# Patient Record
Sex: MALE | Race: WHITE | NOT HISPANIC OR LATINO
[De-identification: names, ages, dates, MRNs, and addresses within clinical notes are randomized per-mention and may not be internally consistent; named-entity substitution may affect disease eponyms.]

---

## 2021-03-13 ENCOUNTER — TRANSCRIPTION ENCOUNTER (OUTPATIENT)
Age: 41
End: 2021-03-13

## 2021-03-26 ENCOUNTER — TRANSCRIPTION ENCOUNTER (OUTPATIENT)
Age: 41
End: 2021-03-26

## 2021-04-22 ENCOUNTER — OUTPATIENT (OUTPATIENT)
Dept: OUTPATIENT SERVICES | Facility: HOSPITAL | Age: 41
LOS: 1 days | End: 2021-04-22
Payer: COMMERCIAL

## 2021-04-22 ENCOUNTER — APPOINTMENT (OUTPATIENT)
Dept: ULTRASOUND IMAGING | Facility: IMAGING CENTER | Age: 41
End: 2021-04-22
Payer: COMMERCIAL

## 2021-04-22 DIAGNOSIS — E07.89 OTHER SPECIFIED DISORDERS OF THYROID: ICD-10-CM

## 2021-04-22 PROBLEM — Z00.00 ENCOUNTER FOR PREVENTIVE HEALTH EXAMINATION: Status: ACTIVE | Noted: 2021-04-22

## 2021-04-22 PROCEDURE — 76536 US EXAM OF HEAD AND NECK: CPT | Mod: 26

## 2021-04-22 PROCEDURE — 76536 US EXAM OF HEAD AND NECK: CPT

## 2023-03-29 ENCOUNTER — APPOINTMENT (OUTPATIENT)
Dept: ORTHOPEDIC SURGERY | Facility: CLINIC | Age: 43
End: 2023-03-29
Payer: COMMERCIAL

## 2023-03-29 VITALS — HEIGHT: 76 IN | WEIGHT: 240 LBS | BODY MASS INDEX: 29.22 KG/M2

## 2023-03-29 DIAGNOSIS — Z78.9 OTHER SPECIFIED HEALTH STATUS: ICD-10-CM

## 2023-03-29 PROCEDURE — 72050 X-RAY EXAM NECK SPINE 4/5VWS: CPT

## 2023-03-29 PROCEDURE — 99204 OFFICE O/P NEW MOD 45 MIN: CPT | Mod: 25

## 2023-03-29 NOTE — IMAGING
[de-identified] : Spine:\par Inspection/Palpation:\par No tenderness to palpation throughout Cervical/thoracic/lumbar spine except mildly ttp L trap\par No bony stepoffs, No lesions.\par \par Gait:\par Non-antalgic\par \par Range of Motion:\par Cervical Spine: Flexion to 45 degrees, extension to 45 degrees, rotation 80 degrees Right, Lateral flexion to 45 degrees Right. Diminished rotation and lateral flexion to LEFt. \par \par \par Neurologic:\par Bilateral upper extremities 5/5 Deltoid/Biceps/Triceps/ Wrist Flexion/Wrist Extension/ / Intrinsics\par \par \par Sensation intact to light touch C5-T1\par \par \par Biceps/Triceps/Brachioradialis Reflex within normal limits\par \par Negative Malik's, No inverted brachioradials reflex\par \par  X-ray Ap/Lateral/Flexion/Extension of cervical spine were viewed and interpreted today.  NO frractures. No spondylolisthesis.  Noraml sagittal alignemtn maintained.,

## 2023-03-29 NOTE — HISTORY OF PRESENT ILLNESS
[Neck] : neck [8] : 8 [0] : 0 [Dull/Aching] : dull/aching [Tightness] : tightness [Leisure] : leisure [Work] : work [Social interactions] : social interactions [Full time] : Work status: full time [de-identified] : 3/29/23: 43 yo male presenting with LEft sided neck pain. Reports on 12/19/22, he went to [employer] show and 200 lb man jumped off stage into crowd causing him to fall and injury his neck. Applied ice for one week, has not had any prior treatment since. Radiating pain into bilateral shoulders. No N/T/ weakness. No balance issues. No h/o neck injury prior to. \par \par Works for [employer] ([email or web address] full time.  [] : no [FreeTextEntry7] : bilateral shoulders

## 2023-04-25 ENCOUNTER — RX RENEWAL (OUTPATIENT)
Age: 43
End: 2023-04-25

## 2023-04-25 RX ORDER — MELOXICAM 15 MG/1
15 TABLET ORAL DAILY
Qty: 30 | Refills: 0 | Status: ACTIVE | COMMUNITY
Start: 2023-03-29 | End: 1900-01-01

## 2023-05-17 ENCOUNTER — APPOINTMENT (OUTPATIENT)
Dept: ORTHOPEDIC SURGERY | Facility: CLINIC | Age: 43
End: 2023-05-17
Payer: COMMERCIAL

## 2023-05-17 VITALS — HEIGHT: 76 IN | WEIGHT: 240 LBS | BODY MASS INDEX: 29.22 KG/M2

## 2023-05-17 DIAGNOSIS — M54.2 CERVICALGIA: ICD-10-CM

## 2023-05-17 PROCEDURE — 99213 OFFICE O/P EST LOW 20 MIN: CPT

## 2023-05-17 NOTE — HISTORY OF PRESENT ILLNESS
[Neck] : neck [2] : 2 [0] : 0 [Dull/Aching] : dull/aching [Tightness] : tightness [Leisure] : leisure [Work] : work [Social interactions] : social interactions [Full time] : Work status: full time [de-identified] : 5/17/23: follow up visit. doing PT 1x/wk with relief. \par \par 3/29/23: 41 yo male presenting with LEft sided neck pain. Reports on 12/19/22, he went to [employer] show and 200 lb man jumped off stage into crowd causing him to fall and injury his neck. Applied ice for one week, has not had any prior treatment since. Radiating pain into bilateral shoulders. No N/T/ weakness. No balance issues. No h/o neck injury prior to. \par \par Works for [employer] ([employer] full time.  [] : no [FreeTextEntry7] : bilateral shoulders

## 2023-05-17 NOTE — ASSESSMENT
[FreeTextEntry1] : 42 M with axial neck pain after traumatic injury 3 months prior \par Pain improved greatly since last visit\par Recommend transition to HEP\par NSAIDS PRN\par FU on an

## 2023-05-17 NOTE — IMAGING
[de-identified] : Spine:\par Inspection/Palpation:\par No tenderness to palpation throughout Cervical/thoracic/lumbar spine except mildly ttp L trap\par No bony stepoffs, No lesions.\par \par Gait:\par Non-antalgic\par \par Range of Motion:\par Cervical Spine: Flexion to 45 degrees, extension to 45 degrees, rotation 80 degrees Right, Lateral flexion to 45 degrees Right. Diminished rotation and lateral flexion to LEFt. \par \par \par Neurologic:\par Bilateral upper extremities 5/5 Deltoid/Biceps/Triceps/ Wrist Flexion/Wrist Extension/ / Intrinsics\par \par \par Sensation intact to light touch C5-T1\par \par \par Biceps/Triceps/Brachioradialis Reflex within normal limits\par \par Negative Malik's, No inverted brachioradials reflex\par \par  X-ray Ap/Lateral/Flexion/Extension of cervical spine were viewed and interpreted today.  NO frractures. No spondylolisthesis.  Noraml sagittal alignemtn maintained.,

## 2025-02-10 ENCOUNTER — APPOINTMENT (OUTPATIENT)
Dept: PODIATRY | Facility: CLINIC | Age: 45
End: 2025-02-10
Payer: COMMERCIAL

## 2025-02-10 ENCOUNTER — NON-APPOINTMENT (OUTPATIENT)
Age: 45
End: 2025-02-10

## 2025-02-10 DIAGNOSIS — Z82.49 FAMILY HISTORY OF ISCHEMIC HEART DISEASE AND OTHER DISEASES OF THE CIRCULATORY SYSTEM: ICD-10-CM

## 2025-02-10 DIAGNOSIS — T69.1XXA CHILBLAINS, INITIAL ENCOUNTER: ICD-10-CM

## 2025-02-10 DIAGNOSIS — Z78.9 OTHER SPECIFIED HEALTH STATUS: ICD-10-CM

## 2025-02-10 DIAGNOSIS — Z87.891 PERSONAL HISTORY OF NICOTINE DEPENDENCE: ICD-10-CM

## 2025-02-10 DIAGNOSIS — I10 ESSENTIAL (PRIMARY) HYPERTENSION: ICD-10-CM

## 2025-02-10 DIAGNOSIS — L60.3 NAIL DYSTROPHY: ICD-10-CM

## 2025-02-10 DIAGNOSIS — L60.0 INGROWING NAIL: ICD-10-CM

## 2025-02-10 DIAGNOSIS — Z83.3 FAMILY HISTORY OF DIABETES MELLITUS: ICD-10-CM

## 2025-02-10 DIAGNOSIS — Z86.79 PERSONAL HISTORY OF OTHER DISEASES OF THE CIRCULATORY SYSTEM: ICD-10-CM

## 2025-02-10 PROCEDURE — 11765 WEDGE EXCISION SKN NAIL FOLD: CPT | Mod: 59,T6

## 2025-02-10 PROCEDURE — 11755 BIOPSY NAIL UNIT: CPT | Mod: 59,TA

## 2025-02-10 PROCEDURE — 99203 OFFICE O/P NEW LOW 30 MIN: CPT | Mod: 25

## 2025-02-10 RX ORDER — BETAMETHASONE DIPROPIONATE 0.5 MG/G
0.05 CREAM TOPICAL TWICE DAILY
Qty: 1 | Refills: 0 | Status: ACTIVE | COMMUNITY
Start: 2025-02-10 | End: 1900-01-01

## 2025-02-10 RX ORDER — OMEPRAZOLE 10 MG/1
10 CAPSULE, DELAYED RELEASE ORAL
Refills: 0 | Status: ACTIVE | COMMUNITY

## 2025-02-10 RX ORDER — LOSARTAN POTASSIUM AND HYDROCHLOROTHIAZIDE 12.5; 5 MG/1; MG/1
50-12.5 TABLET ORAL
Refills: 0 | Status: ACTIVE | COMMUNITY

## 2025-03-03 ENCOUNTER — APPOINTMENT (OUTPATIENT)
Dept: PODIATRY | Facility: CLINIC | Age: 45
End: 2025-03-03
Payer: COMMERCIAL

## 2025-03-03 DIAGNOSIS — T69.1XXA CHILBLAINS, INITIAL ENCOUNTER: ICD-10-CM

## 2025-03-03 PROCEDURE — 99213 OFFICE O/P EST LOW 20 MIN: CPT
